# Patient Record
Sex: MALE | URBAN - METROPOLITAN AREA
[De-identification: names, ages, dates, MRNs, and addresses within clinical notes are randomized per-mention and may not be internally consistent; named-entity substitution may affect disease eponyms.]

---

## 2019-12-19 ENCOUNTER — EMERGENCY (EMERGENCY)
Facility: HOSPITAL | Age: 69
LOS: 1 days | Discharge: HOME | End: 2019-12-19
Attending: EMERGENCY MEDICINE
Payer: MEDICARE

## 2019-12-19 VITALS
OXYGEN SATURATION: 99 % | DIASTOLIC BLOOD PRESSURE: 82 MMHG | RESPIRATION RATE: 20 BRPM | SYSTOLIC BLOOD PRESSURE: 149 MMHG | TEMPERATURE: 99 F | HEART RATE: 83 BPM | HEIGHT: 67 IN | WEIGHT: 153 LBS

## 2019-12-19 PROCEDURE — 99284 EMERGENCY DEPT VISIT MOD MDM: CPT | Mod: GC

## 2019-12-20 VITALS
OXYGEN SATURATION: 98 % | DIASTOLIC BLOOD PRESSURE: 83 MMHG | HEART RATE: 70 BPM | SYSTOLIC BLOOD PRESSURE: 152 MMHG | RESPIRATION RATE: 18 BRPM | TEMPERATURE: 98 F

## 2019-12-20 LAB
ALBUMIN SERPL ELPH-MCNC: 4.5 G/DL — SIGNIFICANT CHANGE UP (ref 3.3–5)
ALP SERPL-CCNC: 112 U/L — SIGNIFICANT CHANGE UP (ref 40–120)
ALT FLD-CCNC: 27 U/L — SIGNIFICANT CHANGE UP (ref 10–45)
ANION GAP SERPL CALC-SCNC: 14 MMOL/L — SIGNIFICANT CHANGE UP (ref 5–17)
AST SERPL-CCNC: 25 U/L — SIGNIFICANT CHANGE UP (ref 10–40)
BASE EXCESS BLDV CALC-SCNC: 1.8 MMOL/L — SIGNIFICANT CHANGE UP (ref -2–2)
BASOPHILS # BLD AUTO: 0 K/UL — SIGNIFICANT CHANGE UP (ref 0–0.2)
BASOPHILS NFR BLD AUTO: 0 % — SIGNIFICANT CHANGE UP (ref 0–2)
BILIRUB SERPL-MCNC: 2.1 MG/DL — HIGH (ref 0.2–1.2)
BUN SERPL-MCNC: 15 MG/DL — SIGNIFICANT CHANGE UP (ref 7–23)
CA-I SERPL-SCNC: 1.28 MMOL/L — SIGNIFICANT CHANGE UP (ref 1.12–1.3)
CALCIUM SERPL-MCNC: 10.3 MG/DL — SIGNIFICANT CHANGE UP (ref 8.4–10.5)
CHLORIDE BLDV-SCNC: 108 MMOL/L — SIGNIFICANT CHANGE UP (ref 96–108)
CHLORIDE SERPL-SCNC: 105 MMOL/L — SIGNIFICANT CHANGE UP (ref 96–108)
CO2 BLDV-SCNC: 27 MMOL/L — SIGNIFICANT CHANGE UP (ref 22–30)
CO2 SERPL-SCNC: 22 MMOL/L — SIGNIFICANT CHANGE UP (ref 22–31)
CREAT SERPL-MCNC: 1.05 MG/DL — SIGNIFICANT CHANGE UP (ref 0.5–1.3)
EOSINOPHIL # BLD AUTO: 0 K/UL — SIGNIFICANT CHANGE UP (ref 0–0.5)
EOSINOPHIL NFR BLD AUTO: 0 % — SIGNIFICANT CHANGE UP (ref 0–6)
GAS PNL BLDV: 143 MMOL/L — SIGNIFICANT CHANGE UP (ref 135–145)
GAS PNL BLDV: SIGNIFICANT CHANGE UP
GAS PNL BLDV: SIGNIFICANT CHANGE UP
GLUCOSE BLDV-MCNC: 139 MG/DL — HIGH (ref 70–99)
GLUCOSE SERPL-MCNC: 140 MG/DL — HIGH (ref 70–99)
HCO3 BLDV-SCNC: 26 MMOL/L — SIGNIFICANT CHANGE UP (ref 21–29)
HCT VFR BLD CALC: 46.3 % — SIGNIFICANT CHANGE UP (ref 39–50)
HCT VFR BLDA CALC: 48 % — SIGNIFICANT CHANGE UP (ref 39–50)
HGB BLD CALC-MCNC: 15.7 G/DL — SIGNIFICANT CHANGE UP (ref 13–17)
HGB BLD-MCNC: 15.2 G/DL — SIGNIFICANT CHANGE UP (ref 13–17)
LACTATE BLDV-MCNC: 1.4 MMOL/L — SIGNIFICANT CHANGE UP (ref 0.7–2)
LIDOCAIN IGE QN: 8 U/L — SIGNIFICANT CHANGE UP (ref 7–60)
LYMPHOCYTES # BLD AUTO: 0.38 K/UL — LOW (ref 1–3.3)
LYMPHOCYTES # BLD AUTO: 2.5 % — LOW (ref 13–44)
MANUAL SMEAR VERIFICATION: SIGNIFICANT CHANGE UP
MCHC RBC-ENTMCNC: 28.2 PG — SIGNIFICANT CHANGE UP (ref 27–34)
MCHC RBC-ENTMCNC: 32.8 GM/DL — SIGNIFICANT CHANGE UP (ref 32–36)
MCV RBC AUTO: 85.9 FL — SIGNIFICANT CHANGE UP (ref 80–100)
MONOCYTES # BLD AUTO: 0.77 K/UL — SIGNIFICANT CHANGE UP (ref 0–0.9)
MONOCYTES NFR BLD AUTO: 5.1 % — SIGNIFICANT CHANGE UP (ref 2–14)
NEUTROPHILS # BLD AUTO: 14.01 K/UL — HIGH (ref 1.8–7.4)
NEUTROPHILS NFR BLD AUTO: 92.4 % — HIGH (ref 43–77)
PCO2 BLDV: 40 MMHG — SIGNIFICANT CHANGE UP (ref 35–50)
PH BLDV: 7.42 — SIGNIFICANT CHANGE UP (ref 7.35–7.45)
PLAT MORPH BLD: NORMAL — SIGNIFICANT CHANGE UP
PLATELET # BLD AUTO: 285 K/UL — SIGNIFICANT CHANGE UP (ref 150–400)
PO2 BLDV: 26 MMHG — SIGNIFICANT CHANGE UP (ref 25–45)
POTASSIUM BLDV-SCNC: 4 MMOL/L — SIGNIFICANT CHANGE UP (ref 3.5–5.3)
POTASSIUM SERPL-MCNC: 4 MMOL/L — SIGNIFICANT CHANGE UP (ref 3.5–5.3)
POTASSIUM SERPL-SCNC: 4 MMOL/L — SIGNIFICANT CHANGE UP (ref 3.5–5.3)
PROT SERPL-MCNC: 8 G/DL — SIGNIFICANT CHANGE UP (ref 6–8.3)
RBC # BLD: 5.39 M/UL — SIGNIFICANT CHANGE UP (ref 4.2–5.8)
RBC # FLD: 13.9 % — SIGNIFICANT CHANGE UP (ref 10.3–14.5)
RBC BLD AUTO: NORMAL — SIGNIFICANT CHANGE UP
SAO2 % BLDV: 46 % — LOW (ref 67–88)
SODIUM SERPL-SCNC: 141 MMOL/L — SIGNIFICANT CHANGE UP (ref 135–145)
WBC # BLD: 15.16 K/UL — HIGH (ref 3.8–10.5)
WBC # FLD AUTO: 15.16 K/UL — HIGH (ref 3.8–10.5)

## 2019-12-20 PROCEDURE — 83605 ASSAY OF LACTIC ACID: CPT

## 2019-12-20 PROCEDURE — 84132 ASSAY OF SERUM POTASSIUM: CPT

## 2019-12-20 PROCEDURE — 85027 COMPLETE CBC AUTOMATED: CPT

## 2019-12-20 PROCEDURE — 76705 ECHO EXAM OF ABDOMEN: CPT | Mod: 26,RT

## 2019-12-20 PROCEDURE — 82330 ASSAY OF CALCIUM: CPT

## 2019-12-20 PROCEDURE — 84295 ASSAY OF SERUM SODIUM: CPT

## 2019-12-20 PROCEDURE — 83690 ASSAY OF LIPASE: CPT

## 2019-12-20 PROCEDURE — 82947 ASSAY GLUCOSE BLOOD QUANT: CPT

## 2019-12-20 PROCEDURE — 96374 THER/PROPH/DIAG INJ IV PUSH: CPT

## 2019-12-20 PROCEDURE — 71046 X-RAY EXAM CHEST 2 VIEWS: CPT | Mod: 26

## 2019-12-20 PROCEDURE — 93005 ELECTROCARDIOGRAM TRACING: CPT

## 2019-12-20 PROCEDURE — 82550 ASSAY OF CK (CPK): CPT

## 2019-12-20 PROCEDURE — 85014 HEMATOCRIT: CPT

## 2019-12-20 PROCEDURE — 82435 ASSAY OF BLOOD CHLORIDE: CPT

## 2019-12-20 PROCEDURE — 82803 BLOOD GASES ANY COMBINATION: CPT

## 2019-12-20 PROCEDURE — 99284 EMERGENCY DEPT VISIT MOD MDM: CPT | Mod: 25

## 2019-12-20 PROCEDURE — 80053 COMPREHEN METABOLIC PANEL: CPT

## 2019-12-20 PROCEDURE — 71046 X-RAY EXAM CHEST 2 VIEWS: CPT

## 2019-12-20 PROCEDURE — 76705 ECHO EXAM OF ABDOMEN: CPT

## 2019-12-20 RX ORDER — ONDANSETRON 8 MG/1
1 TABLET, FILM COATED ORAL
Qty: 6 | Refills: 0
Start: 2019-12-20 | End: 2019-12-22

## 2019-12-20 RX ORDER — ACETAMINOPHEN 500 MG
975 TABLET ORAL ONCE
Refills: 0 | Status: COMPLETED | OUTPATIENT
Start: 2019-12-20 | End: 2019-12-20

## 2019-12-20 RX ORDER — SODIUM CHLORIDE 9 MG/ML
1000 INJECTION INTRAMUSCULAR; INTRAVENOUS; SUBCUTANEOUS ONCE
Refills: 0 | Status: COMPLETED | OUTPATIENT
Start: 2019-12-20 | End: 2019-12-20

## 2019-12-20 RX ORDER — FAMOTIDINE 10 MG/ML
20 INJECTION INTRAVENOUS ONCE
Refills: 0 | Status: COMPLETED | OUTPATIENT
Start: 2019-12-20 | End: 2019-12-20

## 2019-12-20 RX ADMIN — SODIUM CHLORIDE 1000 MILLILITER(S): 9 INJECTION INTRAMUSCULAR; INTRAVENOUS; SUBCUTANEOUS at 01:18

## 2019-12-20 RX ADMIN — Medication 30 MILLILITER(S): at 01:17

## 2019-12-20 RX ADMIN — FAMOTIDINE 20 MILLIGRAM(S): 10 INJECTION INTRAVENOUS at 01:17

## 2019-12-20 RX ADMIN — Medication 975 MILLIGRAM(S): at 01:17

## 2019-12-20 NOTE — ED PROVIDER NOTE - PHYSICAL EXAMINATION
General: Well appearing, awake, alert, oriented, no acute distress. Resting in bed.  HEENT: PERRLA EOMI. No trauma/bruising noted to head or face.   CV: Regular rate and rhythm, S1/S2, no murmurs/rubs/gallops noted on exam. No tenderness to palpation to chest wall.  Lungs: Clear to ascultation bilaterally, no wheezes/crackles/rales noted on exam. Equal chest wall excursion noted.   Abdomen: Soft, non distended, no guarding or rebound. Mild epigastric/RUQ tenderness to palpation. No CVA tenderness to palpation.   MSK: Intact ROM of upper and lower extremities bilaterally. No tenderness to palpation to extremities. Intact ROM of neck. No gross deformities noted to extremities.   Neuro: Awake, A+O x4, moving all extremities spontaneously. CN 2-12 grossly intact. No nystagmus noted. Strength and sensation grossly intact to all extremities. Speech fluent, no slurred speech.   Extremities: No swelling or edema noted to extremities. No calf tenderness to palpation.   Skin: No rash or bruising noted on exam.

## 2019-12-20 NOTE — ED PROVIDER NOTE - PATIENT PORTAL LINK FT
You can access the FollowMyHealth Patient Portal offered by [facility] by registering at the following website: [email or web address] By joining [facility]’s FollowMyHealth portal, you will also be able to view your health information using other applications (apps) compatible with our system.

## 2019-12-20 NOTE — ED PROVIDER NOTE - PROGRESS NOTE DETAILS
Labs grossly wnl (slightly elevated bili). US wnl. Patient resting, feels well. Will d/c home with return precautions after PO challenge  Viet Queen MD, PGY3 Emergency Medicine

## 2019-12-20 NOTE — ED ADULT NURSE NOTE - OBJECTIVE STATEMENT
Patient is a 69y male presenting to the ED ambulatory from home with c/o abdominal pain. Patient A&Ox4. Abdominal pain starting today at 3:30 pm while boarding a plane from [place]. Reports multiple episodes of vomiting, states having about 10-12 episodes of non-bloody emesis. Reports about 5-6 episodes of diarrhea. Reports intermittent abdominal pain predominantly in the epigastric region radiating to the RUQ and LUQ. States laying on his left side helps relieve the pain. States the pain is dull and ranges from 6/10 to 10/10. States soreness in his "rib cage" causing him to be SOB. Reports being taken by EMS from [place] to [facility] states EMS administered Zofran while en route. Patient reports he then left [facility] before being treated and was driven to [facility] ED by family member. PMH COPD. Patient is a 69y male presenting to the ED ambulatory from home with c/o abdominal pain. Patient A&Ox4. Abdominal pain starting today at 3:30 pm while boarding a plane from [place]. Reports multiple episodes of vomiting, states having about 10-12 episodes of non-bloody emesis. Reports about 5-6 episodes of diarrhea. Reports intermittent abdominal pain predominantly in the epigastric region radiating to the RUQ and LUQ. States laying on his left side helps relieve the pain. States the pain is dull and ranges from 6/10 to 10/10. States soreness in his "rib cage" causing him to be SOB. Reports being taken by EMS from [place] airport to [facility] states EMS administered Zofran while en route. Patient reports he then left [facility] before being treated and was driven to [facility] ED by family member. ECG performed at bedside. Patient placed on cardiac monitor. Heart sounds heard upon auscultation. Lung sounds clear bilaterally. Abdomen soft, non-distended and RUQ and RLQ tender upon palpation. Denies chest pain, SOB, headache, fever/chills, burning upon urination or difficulty urinating, hematuria. PMH COPD.

## 2019-12-20 NOTE — ED PROVIDER NOTE - CLINICAL SUMMARY MEDICAL DECISION MAKING FREE TEXT BOX
69M w one day epigastric abdominal pain, N/V, sweats, cough, body aches. Exam as above. Concern for viral syndrome vs other. Will check labs, right upper quadrant US, fluids, re-assess. Currently stable, no acute distress. Will continue to follow up and re-assess. Case discussed with Attending.  Viet Queen MD, PGY3 Emergency Medicine

## 2019-12-20 NOTE — ED PROVIDER NOTE - ATTENDING CONTRIBUTION TO CARE
N/V/D and abd pain x 1 day. mild TTP in epigastric/RUQ. vitals stable, no peritonitis. labs remarkable for elevated WBC, bili of 2.1. pt awaiting RUQ u/s to r/o acute biliary/hepatic pathology. if neg, will tx as viral process w dehydration.

## 2019-12-20 NOTE — ED PROVIDER NOTE - NSFOLLOWUPINSTRUCTIONS_ED_ALL_ED_FT
Please follow up with your primary care physician for further care and evaluation.  If testing was performed in the Emergency Department, please bring copies of all test results to your doctor.  Please continue to take all home medications as previously prescribed.    Return to hospital for any new or concerning symptoms, including but not limited to: fevers, chills, nausea, vomiting, headache, dizziness, lightheadedness, chest pain, shortness of breath, difficulty breathing, abdominal pain, weakness, or any other new or concerning symptoms.     Take zofran 4mg up to twice daily as needed for symptoms (nausea, vomiting)  Ensure adequate oral hydration

## 2019-12-20 NOTE — ED ADULT NURSE NOTE - NSIMPLEMENTINTERV_GEN_ALL_ED
Implemented All Universal Safety Interventions:  [place] to call system. Call bell, personal items and telephone within reach. Instruct patient to call for assistance. Room bathroom lighting operational. Non-slip footwear when patient is off stretcher. Physically safe environment: no spills, clutter or unnecessary equipment. Stretcher in lowest position, wheels locked, appropriate side rails in place.

## 2019-12-20 NOTE — ED ADULT NURSE REASSESSMENT NOTE - NS ED NURSE REASSESS COMMENT FT1
Pt resting quietly on stretcher in no distress. Safety and comfort measures provided and maintained, bed locked and in lowest position, side rails up for safety. Awaiting disposition at this time.

## 2019-12-20 NOTE — ED PROVIDER NOTE - OBJECTIVE STATEMENT
69M, hx copd, presents w chief complaint of abdominal pain, nausea, vomiting. Patient flew in from [place] this morning. Sx began en route to [facility]. patient reports epigastric and BL upper abdominal pain, with nausea, multiple episodes non bloody vomiting today. Also reports 6+ episodes non bloody diarrhea. + chills, sweats. Also with dry cough intermittent as well as BL lower rib discomfort. +Body aches. No shortness of breath, ULLOA, hemoptysis, leg swelling, calf pain, urinary sx, recorded fevers. Decreased PO today. No sick contacts, no prior hx similar. No daily meds except related to COPD. No allergies. No tobacco, ethanol, or drug use.
